# Patient Record
Sex: FEMALE | Race: OTHER | NOT HISPANIC OR LATINO
[De-identification: names, ages, dates, MRNs, and addresses within clinical notes are randomized per-mention and may not be internally consistent; named-entity substitution may affect disease eponyms.]

---

## 2017-03-26 ENCOUNTER — RESULT REVIEW (OUTPATIENT)
Age: 37
End: 2017-03-26

## 2017-04-19 ENCOUNTER — APPOINTMENT (OUTPATIENT)
Dept: ANTEPARTUM | Facility: CLINIC | Age: 37
End: 2017-04-19

## 2017-04-19 ENCOUNTER — ASOB RESULT (OUTPATIENT)
Age: 37
End: 2017-04-19

## 2017-06-14 ENCOUNTER — APPOINTMENT (OUTPATIENT)
Dept: ANTEPARTUM | Facility: CLINIC | Age: 37
End: 2017-06-14

## 2017-06-14 ENCOUNTER — ASOB RESULT (OUTPATIENT)
Age: 37
End: 2017-06-14

## 2017-08-24 ENCOUNTER — EMERGENCY (EMERGENCY)
Facility: HOSPITAL | Age: 37
LOS: 1 days | Discharge: HOME | End: 2017-08-24
Attending: EMERGENCY MEDICINE | Admitting: EMERGENCY MEDICINE
Payer: COMMERCIAL

## 2017-08-24 VITALS
HEART RATE: 101 BPM | OXYGEN SATURATION: 100 % | DIASTOLIC BLOOD PRESSURE: 73 MMHG | SYSTOLIC BLOOD PRESSURE: 110 MMHG | TEMPERATURE: 99 F | RESPIRATION RATE: 16 BRPM

## 2017-08-24 VITALS
TEMPERATURE: 98 F | HEART RATE: 86 BPM | DIASTOLIC BLOOD PRESSURE: 75 MMHG | SYSTOLIC BLOOD PRESSURE: 110 MMHG | RESPIRATION RATE: 16 BRPM | OXYGEN SATURATION: 98 %

## 2017-08-24 DIAGNOSIS — M79.606 PAIN IN LEG, UNSPECIFIED: ICD-10-CM

## 2017-08-24 DIAGNOSIS — Z33.1 PREGNANT STATE, INCIDENTAL: ICD-10-CM

## 2017-08-24 LAB
ALBUMIN SERPL ELPH-MCNC: 3.5 G/DL — SIGNIFICANT CHANGE UP (ref 3.3–5)
ALP SERPL-CCNC: 89 U/L — SIGNIFICANT CHANGE UP (ref 40–120)
ALT FLD-CCNC: 17 U/L RC — SIGNIFICANT CHANGE UP (ref 10–45)
ANION GAP SERPL CALC-SCNC: 14 MMOL/L — SIGNIFICANT CHANGE UP (ref 5–17)
APTT BLD: 27.1 SEC — LOW (ref 27.5–37.4)
AST SERPL-CCNC: 20 U/L — SIGNIFICANT CHANGE UP (ref 10–40)
BASOPHILS # BLD AUTO: 0 K/UL — SIGNIFICANT CHANGE UP (ref 0–0.2)
BASOPHILS NFR BLD AUTO: 0.2 % — SIGNIFICANT CHANGE UP (ref 0–2)
BILIRUB SERPL-MCNC: 0.2 MG/DL — SIGNIFICANT CHANGE UP (ref 0.2–1.2)
BUN SERPL-MCNC: 9 MG/DL — SIGNIFICANT CHANGE UP (ref 7–23)
CALCIUM SERPL-MCNC: 8.9 MG/DL — SIGNIFICANT CHANGE UP (ref 8.4–10.5)
CHLORIDE SERPL-SCNC: 102 MMOL/L — SIGNIFICANT CHANGE UP (ref 96–108)
CO2 SERPL-SCNC: 21 MMOL/L — LOW (ref 22–31)
CREAT SERPL-MCNC: 0.53 MG/DL — SIGNIFICANT CHANGE UP (ref 0.5–1.3)
D DIMER BLD IA.RAPID-MCNC: 461 NG/ML DDU — HIGH
EOSINOPHIL # BLD AUTO: 0.1 K/UL — SIGNIFICANT CHANGE UP (ref 0–0.5)
EOSINOPHIL NFR BLD AUTO: 0.8 % — SIGNIFICANT CHANGE UP (ref 0–6)
GLUCOSE SERPL-MCNC: 111 MG/DL — HIGH (ref 70–99)
HCT VFR BLD CALC: 33.3 % — LOW (ref 34.5–45)
HGB BLD-MCNC: 11.5 G/DL — SIGNIFICANT CHANGE UP (ref 11.5–15.5)
INR BLD: 0.96 RATIO — SIGNIFICANT CHANGE UP (ref 0.88–1.16)
LYMPHOCYTES # BLD AUTO: 1.3 K/UL — SIGNIFICANT CHANGE UP (ref 1–3.3)
LYMPHOCYTES # BLD AUTO: 17.9 % — SIGNIFICANT CHANGE UP (ref 13–44)
MCHC RBC-ENTMCNC: 31.2 PG — SIGNIFICANT CHANGE UP (ref 27–34)
MCHC RBC-ENTMCNC: 34.5 GM/DL — SIGNIFICANT CHANGE UP (ref 32–36)
MCV RBC AUTO: 90.5 FL — SIGNIFICANT CHANGE UP (ref 80–100)
MONOCYTES # BLD AUTO: 0.5 K/UL — SIGNIFICANT CHANGE UP (ref 0–0.9)
MONOCYTES NFR BLD AUTO: 6.7 % — SIGNIFICANT CHANGE UP (ref 2–14)
NEUTROPHILS # BLD AUTO: 5.5 K/UL — SIGNIFICANT CHANGE UP (ref 1.8–7.4)
NEUTROPHILS NFR BLD AUTO: 74.3 % — SIGNIFICANT CHANGE UP (ref 43–77)
PLATELET # BLD AUTO: 115 K/UL — LOW (ref 150–400)
POTASSIUM SERPL-MCNC: 4.2 MMOL/L — SIGNIFICANT CHANGE UP (ref 3.5–5.3)
POTASSIUM SERPL-SCNC: 4.2 MMOL/L — SIGNIFICANT CHANGE UP (ref 3.5–5.3)
PROT SERPL-MCNC: 6.4 G/DL — SIGNIFICANT CHANGE UP (ref 6–8.3)
PROTHROM AB SERPL-ACNC: 10.5 SEC — SIGNIFICANT CHANGE UP (ref 9.8–12.7)
RBC # BLD: 3.68 M/UL — LOW (ref 3.8–5.2)
RBC # FLD: 11.7 % — SIGNIFICANT CHANGE UP (ref 10.3–14.5)
SODIUM SERPL-SCNC: 137 MMOL/L — SIGNIFICANT CHANGE UP (ref 135–145)
WBC # BLD: 7.5 K/UL — SIGNIFICANT CHANGE UP (ref 3.8–10.5)
WBC # FLD AUTO: 7.5 K/UL — SIGNIFICANT CHANGE UP (ref 3.8–10.5)

## 2017-08-24 PROCEDURE — 85730 THROMBOPLASTIN TIME PARTIAL: CPT

## 2017-08-24 PROCEDURE — 93971 EXTREMITY STUDY: CPT

## 2017-08-24 PROCEDURE — 85610 PROTHROMBIN TIME: CPT

## 2017-08-24 PROCEDURE — 80053 COMPREHEN METABOLIC PANEL: CPT

## 2017-08-24 PROCEDURE — 93005 ELECTROCARDIOGRAM TRACING: CPT

## 2017-08-24 PROCEDURE — 99284 EMERGENCY DEPT VISIT MOD MDM: CPT | Mod: 25

## 2017-08-24 PROCEDURE — 71275 CT ANGIOGRAPHY CHEST: CPT

## 2017-08-24 PROCEDURE — 99285 EMERGENCY DEPT VISIT HI MDM: CPT | Mod: 25

## 2017-08-24 PROCEDURE — 85379 FIBRIN DEGRADATION QUANT: CPT

## 2017-08-24 PROCEDURE — 93971 EXTREMITY STUDY: CPT | Mod: 26

## 2017-08-24 PROCEDURE — 85027 COMPLETE CBC AUTOMATED: CPT

## 2017-08-24 PROCEDURE — 93308 TTE F-UP OR LMTD: CPT

## 2017-08-24 PROCEDURE — 93308 TTE F-UP OR LMTD: CPT | Mod: 26

## 2017-08-24 PROCEDURE — 93010 ELECTROCARDIOGRAM REPORT: CPT

## 2017-08-24 PROCEDURE — 71275 CT ANGIOGRAPHY CHEST: CPT | Mod: 26

## 2017-08-24 NOTE — CONSULT NOTE ADULT - PROBLEM SELECTOR RECOMMENDATION 9
-management as per ED  -BISHOP jonas WNL  -ED is suggesting CTA chest to r/o PE, however pt is refusing at this time. Pt counseled extensively on CTA chest and how it would help to r/o PE, which could be life threatening. However, pt and  understand risks and do not wish to proceed w/ CTA chest at this time

## 2017-08-24 NOTE — ED ADULT NURSE NOTE - CHPI ED SYMPTOMS NEG
no vomiting/no decreased eating/drinking/no fever/no chills/no dizziness/no tingling/no weakness/no numbness/no nausea

## 2017-08-24 NOTE — ED PROVIDER NOTE - MEDICAL DECISION MAKING DETAILS
36 yo F  30 weeks pregnant p/w SOB, dizziness, left calf pain, LLE DVT study, labs, ECHO, D-dimer, reeval

## 2017-08-24 NOTE — ED PROVIDER NOTE - PHYSICAL EXAMINATION
Gen: AAOx3, non-toxic  Head: NCAT  HEENT: EOMI, oral mucosa moist, normal conjunctiva  Lung: CTAB, no respiratory distress, no wheezes/rhonchi/rales B/L, speaking in full sentences  CV: RRR, no murmurs, rubs or gallops, no peripheral edema  Abd: soft, NTND, no guarding  MSK: no visible deformities, negative Rene's sign  Neuro: No focal sensory or motor deficits  Skin: Warm, well perfused, no rash  Psych: normal affect.   ~Tevin Aguayo M.D. Resident

## 2017-08-24 NOTE — ED PROCEDURE NOTE - ATTENDING CONTRIBUTION TO CARE
I have participated in and supervised all key portions of the above procedures and agree with the above documentation. KAROLINE Patrick MD

## 2017-08-24 NOTE — ED ADULT NURSE REASSESSMENT NOTE - NS ED NURSE REASSESS COMMENT FT1
Pt is refusing CTA, extensive conversations with , pt and MDs regarding importance of this test in the setting of pregnancy and elevated d dimer.  Pt is supposed to be d/c to OB for further monitoring due to abnormal fetal stress test done here.  Pt is refusing to sign AMA form at this time.  MD Abarca to speak with MD Pelayo re pt options for discharge from ED.  Pt cannot go to OB without being d/c from ED first.

## 2017-08-24 NOTE — ED PROVIDER NOTE - NS ED ROS FT
GENERAL: No fever or chills, EYES: no change in vision, HEENT: no trouble swallowing or speaking, CARDIAC: no chest pain, PULMONARY: +SOB, GI: no abdominal pain, +nausea, no diarrhea or constipation, : No changes in urination, SKIN: no rashes, NEURO: no headache, MSK: left calf pain  ~Tevin Aguayo M.D. Resident

## 2017-08-24 NOTE — ED PROVIDER NOTE - PROGRESS NOTE DETAILS
D-dimer result discussed with patient and  at bedside, plan to do CTA discussed and patient refused. Explanation was given regarding risks of pulmonary embolism that can cause premature heart failure and fetal death. Pt verbalized understanding of risks of not receiving CTA.  inquired about alternative V/Q scan. V/Q scan offered to patient but explanation was given that pt would need to be admitted for V/Q scan and pt refused admission.   Tevin Aguayo M.D. Resident Patient does not wish to be discharged against medical advice, would now like to be admitted for V/Q scan to assess for pulmonary embolism.  Tevin Aguayo M.D. Resident Attending Note (Wendy): patient now refusing v/q scan and agreeable to CTA.  risks/benefits explained to patient and family again.

## 2017-08-24 NOTE — CONSULT NOTE ADULT - PROBLEM SELECTOR RECOMMENDATION 2
-pt with no OB complaints  -NST reactive, cat I    d/w Dr. Zachary Stone MD PGY4 #[id number] -pt with no OB complaints  -NST in progress    d/w Dr. Zachary Stone MD PGY4 #[id number]

## 2017-08-24 NOTE — ED PROVIDER NOTE - ATTENDING CONTRIBUTION TO CARE
patient pregnant complaining of left calf pain and sob.  tachycardic in ED.  left calf non tender, +homanns.  US DVT negative.  concern for PE. will perform d-dimer.    Pregnancy adjusted d-dimer positive.  will perform CTA.

## 2017-08-24 NOTE — ED PROVIDER NOTE - OBJECTIVE STATEMENT
36 yo F  30 weeks pregnant p/w dizziness, nausea, left calf pain. When patient awoke this AM she felt a lump in her left calf and when she massaged it the lump disappeared. She later felt dizzy and SOB while ambulating. She was told at her OB that she had low platelets and borderline glucose with the oral glucose tolerance test. Denies CP, fevers, headaches.

## 2017-08-24 NOTE — ED ADULT NURSE NOTE - OBJECTIVE STATEMENT
37 yr old female 30 weeks pregnant present to the ER for left calf pain. Pt reports that she woke up around 10 am with a sharp pain in the calf; reports pain level of 10/10 on pain scale and sharp in quality. Pt denies any recent travels. Pt also report that she has shortness of breath upon exertion. Pt denies chest pain .

## 2017-08-24 NOTE — ED PROVIDER NOTE - PLAN OF CARE
You were seen in the Emergency Department for dizziness, shortness of breath, and leg pain. Advance activity as tolerated. Continue all previously prescribed medications as directed.  Follow up with your obstetrician in 24-48 hours - take copies of your results. You will need to repeat the lower extremity ultrasound in 5-7 days to reevaluate for a deep vein thrombosis. Return to the Emergency Department for worsening or persistent symptoms, and/or ANY NEW OR CONCERNING SYMPTOMS. If you have issues obtaining follow up, please call: [phone] to obtain a doctor or specialist who takes your insurance in your area.

## 2017-08-24 NOTE — CONSULT NOTE ADULT - SUBJECTIVE AND OBJECTIVE BOX
36 yo  @ 30+1 EDC 17 presents c/o dizziness, SOB, and L calf pain. Pt states that she began to feel LLE pain this AM w/ lump in LLE. Also reports weakness, dizziness, and SOB. Denies palpitations, HA, visual changes. Pt  has vomited x 1. PNC c/b low platelets. Reports good FM. Denies ctx, LOF, or VB.     OBHx: 2005 ,  LTCS breech,  LTCS for NRFHT failed TOLAC, 1 SAB s/p D&C  GynHx: none  PMH: kidney stones  PSH: c/s x 2, D&C x 1  All: NKDA  Meds: PNV  SocialHx: denies x 3    Vital Signs Last 24 Hrs  T(C): 37.1 (24 Aug 2017 14:19), Max: 37.1 (24 Aug 2017 14:19)  T(F): 98.8 (24 Aug 2017 14:19), Max: 98.8 (24 Aug 2017 14:19)  HR: 104 (24 Aug 2017 15:08) (101 - 104)  BP: 118/75 (24 Aug 2017 15:08) (110/73 - 118/75)  BP(mean): --  RR: 20 (24 Aug 2017 15:08) (16 - 20)  SpO2: 99% (24 Aug 2017 15:08) (99% - 100%)    PE:  Gen: NAD  CV: tachycardic, regular rhythm  Lungs: CTABL  Abd: soft NT ND gravid  Ext: NTBL, no edema noted    LABS:                        11.5   7.5   )-----------( 115      ( 24 Aug 2017 15:30 )             33.3     08-24    137  |  102  |  9   ----------------------------<  111<H>  4.2   |  21<L>  |  0.53    Ca    8.9      24 Aug 2017 15:30    TPro  6.4  /  Alb  3.5  /  TBili  0.2  /  DBili  x   /  AST  20  /  ALT  17  /  AlkPhos  89  08-24    PT/INR - ( 24 Aug 2017 16:39 )   PT: 10.5 sec;   INR: 0.96 ratio         PTT - ( 24 Aug 2017 16:39 )  PTT:27.1 sec      RADIOLOGY & ADDITIONAL STUDIES:   HPI:    37y G_P_, LMP      presents with       OBHx:  GynHx:   PMH:  PSH:  All:  Meds:   SocialHx:     Vital Signs Last 24 Hrs  T(C): 37.1 (24 Aug 2017 14:19), Max: 37.1 (24 Aug 2017 14:19)  T(F): 98.8 (24 Aug 2017 14:19), Max: 98.8 (24 Aug 2017 14:19)  HR: 104 (24 Aug 2017 15:08) (101 - 104)  BP: 118/75 (24 Aug 2017 15:08) (110/73 - 118/75)  BP(mean): --  RR: 20 (24 Aug 2017 15:08) (16 - 20)  SpO2: 99% (24 Aug 2017 15:08) (99% - 100%)    PE:  Gen: Comfortable, NAD  CV: RRR  Pulm: CTAB  Abd: Soft, NT  Ext: No edema or tenderness bilaterally  Spec Exam:    LABS:                        11.5   7.5   )-----------( 115      ( 24 Aug 2017 15:30 )             33.3     08-24    137  |  102  |  9   ----------------------------<  111<H>  4.2   |  21<L>  |  0.53    Ca    8.9      24 Aug 2017 15:30    TPro  6.4  /  Alb  3.5  /  TBili  0.2  /  DBili  x   /  AST  20  /  ALT  17  /  AlkPhos  89  08-24    PT/INR - ( 24 Aug 2017 16:39 )   PT: 10.5 sec;   INR: 0.96 ratio         PTT - ( 24 Aug 2017 16:39 )  PTT:27.1 sec      RADIOLOGY & ADDITIONAL STUDIES:      A/P: 37y G P   who present with  -  -      Cindi Stone PGY4 36 yo  @ 30+1 EDC 17 presents c/o dizziness, SOB, and L calf pain. Pt states that she began to feel LLE pain this AM w/ lump in LLE. Also reports weakness, dizziness, and SOB. Denies palpitations, HA, visual changes. Pt  has vomited x 1. PNC c/b low platelets. Reports good FM. Denies ctx, LOF, or VB.     OBHx: 2005 ,  LTCS breech,  LTCS for NRFHT failed TOLAC, 1 SAB s/p D&C  GynHx: none  PMH: kidney stones  PSH: c/s x 2, D&C x 1  All: NKDA  Meds: PNV  SocialHx: denies x 3    Vital Signs Last 24 Hrs  T(C): 37.1 (24 Aug 2017 14:19), Max: 37.1 (24 Aug 2017 14:19)  T(F): 98.8 (24 Aug 2017 14:19), Max: 98.8 (24 Aug 2017 14:19)  HR: 104 (24 Aug 2017 15:08) (101 - 104)  BP: 118/75 (24 Aug 2017 15:08) (110/73 - 118/75)  BP(mean): --  RR: 20 (24 Aug 2017 15:08) (16 - 20)  SpO2: 99% (24 Aug 2017 15:08) (99% - 100%)    PE:  Gen: NAD  CV: tachycardic, regular rhythm  Lungs: CTABL  Abd: soft NT ND gravid  Ext: NTBL, no edema noted    LABS:                        11.5   7.5   )-----------( 115      ( 24 Aug 2017 15:30 )             33.3     08-24    137  |  102  |  9   ----------------------------<  111<H>  4.2   |  21<L>  |  0.53    Ca    8.9      24 Aug 2017 15:30    TPro  6.4  /  Alb  3.5  /  TBili  0.2  /  DBili  x   /  AST  20  /  ALT  17  /  AlkPhos  89  08-24    PT/INR - ( 24 Aug 2017 16:39 )   PT: 10.5 sec;   INR: 0.96 ratio         PTT - ( 24 Aug 2017 16:39 )  PTT:27.1 sec      RADIOLOGY & ADDITIONAL STUDIES: 36 yo  @ 30+1 EDC 17 presents c/o dizziness, SOB, and L calf pain. Pt states that she began to feel LLE pain this AM w/ lump in LLE. Also reports weakness, dizziness, and SOB. Denies palpitations, HA, visual changes. Pt  has vomited x 1. PNC c/b low platelets. Reports good FM. Denies ctx, LOF, or VB.     OBHx: 2005 ,  LTCS breech,  LTCS for NRFHT failed TOLAC, 1 SAB s/p D&C  GynHx: none  PMH: kidney stones  PSH: c/s x 2, D&C x 1  All: NKDA  Meds: PNV  SocialHx: denies x 3    Vital Signs Last 24 Hrs  T(C): 37.1 (24 Aug 2017 14:19), Max: 37.1 (24 Aug 2017 14:19)  T(F): 98.8 (24 Aug 2017 14:19), Max: 98.8 (24 Aug 2017 14:19)  HR: 104 (24 Aug 2017 15:08) (101 - 104)  BP: 118/75 (24 Aug 2017 15:08) (110/73 - 118/75)  BP(mean): --  RR: 20 (24 Aug 2017 15:08) (16 - 20)  SpO2: 99% (24 Aug 2017 15:08) (99% - 100%)    PE:  Gen: NAD  CV: tachycardic, regular rhythm  Lungs: CTABL  Abd: soft NT ND gravid  Ext: NTBL, no edema noted    LABS:                        11.5   7.5   )-----------( 115      ( 24 Aug 2017 15:30 )             33.3     08-24    137  |  102  |  9   ----------------------------<  111<H>  4.2   |  21<L>  |  0.53    Ca    8.9      24 Aug 2017 15:30    TPro  6.4  /  Alb  3.5  /  TBili  0.2  /  DBili  x   /  AST  20  /  ALT  17  /  AlkPhos  89  08-24    PT/INR - ( 24 Aug 2017 16:39 )   PT: 10.5 sec;   INR: 0.96 ratio         PTT - ( 24 Aug 2017 16:39 )  PTT:27.1 sec      RADIOLOGY & ADDITIONAL STUDIES:    Procedure was performed in the Emergency Department by a credentialed   Emergency Medicine Attending Physician    EXAM:  ED TTE LIMITED [id number]      ORDER COMMENTS:      PROCEDURE DATE:  2017    FOCUSED ED ULTRASOUND REPORT          INTERPRETATION:  A focused transthoracic cardiac ultrasound examination   was performed.   No pericardial effusion was present.    No global wall motion abnormality was identified.    Additionally, a limited transabdominal fetal assessment was performed.    Fetal heart rate assessed, 167 bpm.    IMPRESSION:   No Pericardial Effusion.  FHR 167bpm      Procedure was performed in the Emergency Department by a credentialed   Emergency Medicine Attending Physician    EXAM:  ED US DVT UNIL LTD [id number]      ORDER COMMENTS:      PROCEDURE DATE:  2017    FOCUSED ED ULTRASOUND REPORT          INTERPRETATION:  Focused imaging of the left and right lower extremities   was performed using gray scale compression technique for evaluation of   deep venous thrombosis.   The femoral region was scanned from the common femoral vein proximal to   the greater saphenous vein insertion distally to the femoral/deep femoral   vein junction.  The popliteal region was scanned from the popliteal vein distally to the   trifurcation.  There is no evidence of proximal deep venous thrombosis.  Calf vein   thrombosis and focal segmental deep venous thrombosis cannot be excluded.  The patient should have a repeat lower extremity ultrasound in 5-7 days.     IMPRESSION:  No Evidence of proximal deep vein thrombosis in the left and   right  lower extremities.

## 2017-10-10 ENCOUNTER — RESULT REVIEW (OUTPATIENT)
Age: 37
End: 2017-10-10

## 2017-10-10 ENCOUNTER — INPATIENT (INPATIENT)
Facility: HOSPITAL | Age: 37
LOS: 1 days | Discharge: HOME | End: 2017-10-12
Attending: OBSTETRICS & GYNECOLOGY | Admitting: OBSTETRICS & GYNECOLOGY
Payer: COMMERCIAL

## 2017-10-10 VITALS
HEART RATE: 87 BPM | RESPIRATION RATE: 20 BRPM | OXYGEN SATURATION: 96 % | TEMPERATURE: 101 F | SYSTOLIC BLOOD PRESSURE: 131 MMHG | DIASTOLIC BLOOD PRESSURE: 89 MMHG

## 2017-10-10 DIAGNOSIS — Z3A.00 WEEKS OF GESTATION OF PREGNANCY NOT SPECIFIED: ICD-10-CM

## 2017-10-10 DIAGNOSIS — O26.899 OTHER SPECIFIED PREGNANCY RELATED CONDITIONS, UNSPECIFIED TRIMESTER: ICD-10-CM

## 2017-10-10 DIAGNOSIS — Z34.80 ENCOUNTER FOR SUPERVISION OF OTHER NORMAL PREGNANCY, UNSPECIFIED TRIMESTER: ICD-10-CM

## 2017-10-10 LAB
BASOPHILS # BLD AUTO: 0 K/UL — SIGNIFICANT CHANGE UP (ref 0–0.2)
BASOPHILS NFR BLD AUTO: 0.7 % — SIGNIFICANT CHANGE UP (ref 0–2)
BLD GP AB SCN SERPL QL: NEGATIVE — SIGNIFICANT CHANGE UP
EOSINOPHIL # BLD AUTO: 0 K/UL — SIGNIFICANT CHANGE UP (ref 0–0.5)
EOSINOPHIL NFR BLD AUTO: 0.6 % — SIGNIFICANT CHANGE UP (ref 0–6)
HCT VFR BLD CALC: 37.4 % — SIGNIFICANT CHANGE UP (ref 34.5–45)
HGB BLD-MCNC: 13.1 G/DL — SIGNIFICANT CHANGE UP (ref 11.5–15.5)
LYMPHOCYTES # BLD AUTO: 1.5 K/UL — SIGNIFICANT CHANGE UP (ref 1–3.3)
LYMPHOCYTES # BLD AUTO: 24.7 % — SIGNIFICANT CHANGE UP (ref 13–44)
MCHC RBC-ENTMCNC: 31.4 PG — SIGNIFICANT CHANGE UP (ref 27–34)
MCHC RBC-ENTMCNC: 35.1 GM/DL — SIGNIFICANT CHANGE UP (ref 32–36)
MCV RBC AUTO: 89.2 FL — SIGNIFICANT CHANGE UP (ref 80–100)
MONOCYTES # BLD AUTO: 0.4 K/UL — SIGNIFICANT CHANGE UP (ref 0–0.9)
MONOCYTES NFR BLD AUTO: 7 % — SIGNIFICANT CHANGE UP (ref 2–14)
NEUTROPHILS # BLD AUTO: 4.2 K/UL — SIGNIFICANT CHANGE UP (ref 1.8–7.4)
NEUTROPHILS NFR BLD AUTO: 67.1 % — SIGNIFICANT CHANGE UP (ref 43–77)
PLATELET # BLD AUTO: 120 K/UL — LOW (ref 150–400)
RBC # BLD: 4.19 M/UL — SIGNIFICANT CHANGE UP (ref 3.8–5.2)
RBC # FLD: 12.3 % — SIGNIFICANT CHANGE UP (ref 10.3–14.5)
RH IG SCN BLD-IMP: POSITIVE — SIGNIFICANT CHANGE UP
RH IG SCN BLD-IMP: POSITIVE — SIGNIFICANT CHANGE UP
T PALLIDUM AB TITR SER: NEGATIVE — SIGNIFICANT CHANGE UP
WBC # BLD: 6.2 K/UL — SIGNIFICANT CHANGE UP (ref 3.8–10.5)
WBC # FLD AUTO: 6.2 K/UL — SIGNIFICANT CHANGE UP (ref 3.8–10.5)

## 2017-10-10 PROCEDURE — 88302 TISSUE EXAM BY PATHOLOGIST: CPT | Mod: 26

## 2017-10-10 RX ORDER — SODIUM CHLORIDE 9 MG/ML
1000 INJECTION, SOLUTION INTRAVENOUS
Qty: 0 | Refills: 0 | Status: DISCONTINUED | OUTPATIENT
Start: 2017-10-10 | End: 2017-10-12

## 2017-10-10 RX ORDER — KETOROLAC TROMETHAMINE 30 MG/ML
30 SYRINGE (ML) INJECTION EVERY 6 HOURS
Qty: 0 | Refills: 0 | Status: DISCONTINUED | OUTPATIENT
Start: 2017-10-10 | End: 2017-10-12

## 2017-10-10 RX ORDER — CEFAZOLIN SODIUM 1 G
VIAL (EA) INJECTION
Qty: 0 | Refills: 0 | Status: DISCONTINUED | OUTPATIENT
Start: 2017-10-10 | End: 2017-10-11

## 2017-10-10 RX ORDER — NALOXONE HYDROCHLORIDE 4 MG/.1ML
0.1 SPRAY NASAL
Qty: 0 | Refills: 0 | Status: DISCONTINUED | OUTPATIENT
Start: 2017-10-10 | End: 2017-10-12

## 2017-10-10 RX ORDER — SODIUM CHLORIDE 9 MG/ML
1000 INJECTION, SOLUTION INTRAVENOUS
Qty: 0 | Refills: 0 | Status: DISCONTINUED | OUTPATIENT
Start: 2017-10-10 | End: 2017-10-10

## 2017-10-10 RX ORDER — GLYCERIN ADULT
1 SUPPOSITORY, RECTAL RECTAL AT BEDTIME
Qty: 0 | Refills: 0 | Status: DISCONTINUED | OUTPATIENT
Start: 2017-10-10 | End: 2017-10-12

## 2017-10-10 RX ORDER — HEPARIN SODIUM 5000 [USP'U]/ML
5000 INJECTION INTRAVENOUS; SUBCUTANEOUS EVERY 12 HOURS
Qty: 0 | Refills: 0 | Status: DISCONTINUED | OUTPATIENT
Start: 2017-10-10 | End: 2017-10-10

## 2017-10-10 RX ORDER — OXYCODONE HYDROCHLORIDE 5 MG/1
5 TABLET ORAL
Qty: 0 | Refills: 0 | Status: COMPLETED | OUTPATIENT
Start: 2017-10-10 | End: 2017-10-17

## 2017-10-10 RX ORDER — FAMOTIDINE 10 MG/ML
20 INJECTION INTRAVENOUS ONCE
Qty: 0 | Refills: 0 | Status: COMPLETED | OUTPATIENT
Start: 2017-10-10 | End: 2017-10-10

## 2017-10-10 RX ORDER — CITRIC ACID/SODIUM CITRATE 300-500 MG
30 SOLUTION, ORAL ORAL ONCE
Qty: 0 | Refills: 0 | Status: COMPLETED | OUTPATIENT
Start: 2017-10-10 | End: 2017-10-10

## 2017-10-10 RX ORDER — DEXAMETHASONE 0.5 MG/5ML
4 ELIXIR ORAL EVERY 6 HOURS
Qty: 0 | Refills: 0 | Status: DISCONTINUED | OUTPATIENT
Start: 2017-10-10 | End: 2017-10-12

## 2017-10-10 RX ORDER — ACETAMINOPHEN 500 MG
650 TABLET ORAL EVERY 6 HOURS
Qty: 0 | Refills: 0 | Status: DISCONTINUED | OUTPATIENT
Start: 2017-10-10 | End: 2017-10-12

## 2017-10-10 RX ORDER — OXYTOCIN 10 UNIT/ML
333.33 VIAL (ML) INJECTION
Qty: 20 | Refills: 0 | Status: DISCONTINUED | OUTPATIENT
Start: 2017-10-10 | End: 2017-10-12

## 2017-10-10 RX ORDER — SIMETHICONE 80 MG/1
80 TABLET, CHEWABLE ORAL EVERY 4 HOURS
Qty: 0 | Refills: 0 | Status: DISCONTINUED | OUTPATIENT
Start: 2017-10-10 | End: 2017-10-12

## 2017-10-10 RX ORDER — DIPHENHYDRAMINE HCL 50 MG
25 CAPSULE ORAL EVERY 6 HOURS
Qty: 0 | Refills: 0 | Status: DISCONTINUED | OUTPATIENT
Start: 2017-10-10 | End: 2017-10-12

## 2017-10-10 RX ORDER — LANOLIN
1 OINTMENT (GRAM) TOPICAL
Qty: 0 | Refills: 0 | Status: DISCONTINUED | OUTPATIENT
Start: 2017-10-10 | End: 2017-10-12

## 2017-10-10 RX ORDER — IBUPROFEN 200 MG
600 TABLET ORAL EVERY 6 HOURS
Qty: 0 | Refills: 0 | Status: COMPLETED | OUTPATIENT
Start: 2017-10-10 | End: 2018-09-08

## 2017-10-10 RX ORDER — DOCUSATE SODIUM 100 MG
100 CAPSULE ORAL
Qty: 0 | Refills: 0 | Status: DISCONTINUED | OUTPATIENT
Start: 2017-10-10 | End: 2017-10-12

## 2017-10-10 RX ORDER — HYDROMORPHONE HYDROCHLORIDE 2 MG/ML
0.5 INJECTION INTRAMUSCULAR; INTRAVENOUS; SUBCUTANEOUS
Qty: 0 | Refills: 0 | Status: DISCONTINUED | OUTPATIENT
Start: 2017-10-10 | End: 2017-10-12

## 2017-10-10 RX ORDER — METOCLOPRAMIDE HCL 10 MG
10 TABLET ORAL ONCE
Qty: 0 | Refills: 0 | Status: DISCONTINUED | OUTPATIENT
Start: 2017-10-10 | End: 2017-10-10

## 2017-10-10 RX ORDER — INFLUENZA VIRUS VACCINE 15; 15; 15; 15 UG/.5ML; UG/.5ML; UG/.5ML; UG/.5ML
0.5 SUSPENSION INTRAMUSCULAR ONCE
Qty: 0 | Refills: 0 | Status: DISCONTINUED | OUTPATIENT
Start: 2017-10-10 | End: 2017-10-12

## 2017-10-10 RX ORDER — HYDROMORPHONE HYDROCHLORIDE 2 MG/ML
30 INJECTION INTRAMUSCULAR; INTRAVENOUS; SUBCUTANEOUS
Qty: 0 | Refills: 0 | Status: DISCONTINUED | OUTPATIENT
Start: 2017-10-10 | End: 2017-10-10

## 2017-10-10 RX ORDER — CEFAZOLIN SODIUM 1 G
2000 VIAL (EA) INJECTION EVERY 8 HOURS
Qty: 0 | Refills: 0 | Status: DISCONTINUED | OUTPATIENT
Start: 2017-10-10 | End: 2017-10-11

## 2017-10-10 RX ORDER — HEPARIN SODIUM 5000 [USP'U]/ML
5000 INJECTION INTRAVENOUS; SUBCUTANEOUS EVERY 12 HOURS
Qty: 0 | Refills: 0 | Status: DISCONTINUED | OUTPATIENT
Start: 2017-10-10 | End: 2017-10-12

## 2017-10-10 RX ORDER — ACETAMINOPHEN 500 MG
1000 TABLET ORAL ONCE
Qty: 0 | Refills: 0 | Status: COMPLETED | OUTPATIENT
Start: 2017-10-10 | End: 2017-10-10

## 2017-10-10 RX ORDER — OXYCODONE HYDROCHLORIDE 5 MG/1
5 TABLET ORAL EVERY 4 HOURS
Qty: 0 | Refills: 0 | Status: DISCONTINUED | OUTPATIENT
Start: 2017-10-10 | End: 2017-10-12

## 2017-10-10 RX ORDER — SODIUM CHLORIDE 9 MG/ML
1000 INJECTION, SOLUTION INTRAVENOUS ONCE
Qty: 0 | Refills: 0 | Status: COMPLETED | OUTPATIENT
Start: 2017-10-10 | End: 2017-10-10

## 2017-10-10 RX ORDER — CEFAZOLIN SODIUM 1 G
2000 VIAL (EA) INJECTION ONCE
Qty: 0 | Refills: 0 | Status: COMPLETED | OUTPATIENT
Start: 2017-10-10 | End: 2017-10-10

## 2017-10-10 RX ORDER — ONDANSETRON 8 MG/1
4 TABLET, FILM COATED ORAL EVERY 6 HOURS
Qty: 0 | Refills: 0 | Status: DISCONTINUED | OUTPATIENT
Start: 2017-10-10 | End: 2017-10-12

## 2017-10-10 RX ORDER — ACETAMINOPHEN 500 MG
975 TABLET ORAL EVERY 6 HOURS
Qty: 0 | Refills: 0 | Status: DISCONTINUED | OUTPATIENT
Start: 2017-10-10 | End: 2017-10-12

## 2017-10-10 RX ORDER — HYDROMORPHONE HYDROCHLORIDE 2 MG/ML
30 INJECTION INTRAMUSCULAR; INTRAVENOUS; SUBCUTANEOUS
Qty: 0 | Refills: 0 | Status: DISCONTINUED | OUTPATIENT
Start: 2017-10-10 | End: 2017-10-12

## 2017-10-10 RX ORDER — OXYTOCIN 10 UNIT/ML
41.67 VIAL (ML) INJECTION
Qty: 20 | Refills: 0 | Status: DISCONTINUED | OUTPATIENT
Start: 2017-10-10 | End: 2017-10-12

## 2017-10-10 RX ORDER — FERROUS SULFATE 325(65) MG
325 TABLET ORAL DAILY
Qty: 0 | Refills: 0 | Status: DISCONTINUED | OUTPATIENT
Start: 2017-10-10 | End: 2017-10-11

## 2017-10-10 RX ORDER — TETANUS TOXOID, REDUCED DIPHTHERIA TOXOID AND ACELLULAR PERTUSSIS VACCINE, ADSORBED 5; 2.5; 8; 8; 2.5 [IU]/.5ML; [IU]/.5ML; UG/.5ML; UG/.5ML; UG/.5ML
0.5 SUSPENSION INTRAMUSCULAR ONCE
Qty: 0 | Refills: 0 | Status: COMPLETED | OUTPATIENT
Start: 2017-10-12

## 2017-10-10 RX ADMIN — HEPARIN SODIUM 5000 UNIT(S): 5000 INJECTION INTRAVENOUS; SUBCUTANEOUS at 19:11

## 2017-10-10 RX ADMIN — HYDROMORPHONE HYDROCHLORIDE 30 MILLILITER(S): 2 INJECTION INTRAMUSCULAR; INTRAVENOUS; SUBCUTANEOUS at 06:59

## 2017-10-10 RX ADMIN — HYDROMORPHONE HYDROCHLORIDE 30 MILLILITER(S): 2 INJECTION INTRAMUSCULAR; INTRAVENOUS; SUBCUTANEOUS at 09:49

## 2017-10-10 RX ADMIN — Medication 400 MILLIGRAM(S): at 06:55

## 2017-10-10 RX ADMIN — Medication 100 MILLIGRAM(S): at 12:37

## 2017-10-10 RX ADMIN — Medication 30 MILLILITER(S): at 04:18

## 2017-10-10 RX ADMIN — ONDANSETRON 4 MILLIGRAM(S): 8 TABLET, FILM COATED ORAL at 17:41

## 2017-10-10 RX ADMIN — Medication 30 MILLIGRAM(S): at 17:55

## 2017-10-10 RX ADMIN — SIMETHICONE 80 MILLIGRAM(S): 80 TABLET, CHEWABLE ORAL at 12:37

## 2017-10-10 RX ADMIN — HYDROMORPHONE HYDROCHLORIDE 30 MILLILITER(S): 2 INJECTION INTRAMUSCULAR; INTRAVENOUS; SUBCUTANEOUS at 13:37

## 2017-10-10 RX ADMIN — Medication 30 MILLIGRAM(S): at 08:25

## 2017-10-10 RX ADMIN — FAMOTIDINE 20 MILLIGRAM(S): 10 INJECTION INTRAVENOUS at 04:18

## 2017-10-10 RX ADMIN — SODIUM CHLORIDE 125 MILLILITER(S): 9 INJECTION, SOLUTION INTRAVENOUS at 04:10

## 2017-10-10 RX ADMIN — SODIUM CHLORIDE 2000 MILLILITER(S): 9 INJECTION, SOLUTION INTRAVENOUS at 03:30

## 2017-10-10 RX ADMIN — Medication 975 MILLIGRAM(S): at 12:37

## 2017-10-10 RX ADMIN — Medication 975 MILLIGRAM(S): at 13:03

## 2017-10-10 RX ADMIN — Medication 30 MILLIGRAM(S): at 17:41

## 2017-10-10 RX ADMIN — Medication 100 MILLIGRAM(S): at 04:55

## 2017-10-10 RX ADMIN — Medication 100 MILLIGRAM(S): at 16:07

## 2017-10-10 NOTE — PATIENT PROFILE OB - NS PRO INDICAT FLU
Patient/surrogate requesting vaccine Patient/surrogate requesting vaccine/Patient is immunocompromised

## 2017-10-11 ENCOUNTER — TRANSCRIPTION ENCOUNTER (OUTPATIENT)
Age: 37
End: 2017-10-11

## 2017-10-11 LAB
HCT VFR BLD CALC: 27.3 % — LOW (ref 34.5–45)
HGB BLD-MCNC: 9.3 G/DL — LOW (ref 11.5–15.5)
MCHC RBC-ENTMCNC: 31.2 PG — SIGNIFICANT CHANGE UP (ref 27–34)
MCHC RBC-ENTMCNC: 34.1 GM/DL — SIGNIFICANT CHANGE UP (ref 32–36)
MCV RBC AUTO: 91.3 FL — SIGNIFICANT CHANGE UP (ref 80–100)
PLATELET # BLD AUTO: 85 K/UL — LOW (ref 150–400)
RBC # BLD: 2.99 M/UL — LOW (ref 3.8–5.2)
RBC # FLD: 12.2 % — SIGNIFICANT CHANGE UP (ref 10.3–14.5)
WBC # BLD: 8.3 K/UL — SIGNIFICANT CHANGE UP (ref 3.8–10.5)
WBC # FLD AUTO: 8.3 K/UL — SIGNIFICANT CHANGE UP (ref 3.8–10.5)

## 2017-10-11 RX ORDER — ASCORBIC ACID 60 MG
250 TABLET,CHEWABLE ORAL
Qty: 0 | Refills: 0 | Status: DISCONTINUED | OUTPATIENT
Start: 2017-10-11 | End: 2017-10-12

## 2017-10-11 RX ORDER — FERROUS SULFATE 325(65) MG
325 TABLET ORAL
Qty: 0 | Refills: 0 | Status: DISCONTINUED | OUTPATIENT
Start: 2017-10-11 | End: 2017-10-12

## 2017-10-11 RX ADMIN — Medication 100 MILLIGRAM(S): at 08:15

## 2017-10-11 RX ADMIN — Medication 30 MILLIGRAM(S): at 13:10

## 2017-10-11 RX ADMIN — Medication 100 MILLIGRAM(S): at 17:49

## 2017-10-11 RX ADMIN — SODIUM CHLORIDE 125 MILLILITER(S): 9 INJECTION, SOLUTION INTRAVENOUS at 16:32

## 2017-10-11 RX ADMIN — Medication 30 MILLIGRAM(S): at 06:59

## 2017-10-11 RX ADMIN — Medication 30 MILLIGRAM(S): at 12:40

## 2017-10-11 RX ADMIN — HYDROMORPHONE HYDROCHLORIDE 30 MILLILITER(S): 2 INJECTION INTRAMUSCULAR; INTRAVENOUS; SUBCUTANEOUS at 07:19

## 2017-10-11 RX ADMIN — Medication 30 MILLIGRAM(S): at 06:35

## 2017-10-11 RX ADMIN — Medication 30 MILLIGRAM(S): at 17:50

## 2017-10-11 RX ADMIN — HEPARIN SODIUM 5000 UNIT(S): 5000 INJECTION INTRAVENOUS; SUBCUTANEOUS at 17:49

## 2017-10-11 RX ADMIN — Medication 30 MILLIGRAM(S): at 18:20

## 2017-10-11 RX ADMIN — Medication 30 MILLIGRAM(S): at 01:00

## 2017-10-11 RX ADMIN — HYDROMORPHONE HYDROCHLORIDE 30 MILLILITER(S): 2 INJECTION INTRAMUSCULAR; INTRAVENOUS; SUBCUTANEOUS at 19:10

## 2017-10-11 RX ADMIN — Medication 325 MILLIGRAM(S): at 17:49

## 2017-10-11 RX ADMIN — Medication 250 MILLIGRAM(S): at 17:50

## 2017-10-11 RX ADMIN — Medication 1 TABLET(S): at 12:40

## 2017-10-11 RX ADMIN — Medication 100 MILLIGRAM(S): at 00:42

## 2017-10-11 RX ADMIN — HEPARIN SODIUM 5000 UNIT(S): 5000 INJECTION INTRAVENOUS; SUBCUTANEOUS at 06:36

## 2017-10-11 RX ADMIN — Medication 30 MILLIGRAM(S): at 00:42

## 2017-10-11 NOTE — PROGRESS NOTE ADULT - SUBJECTIVE AND OBJECTIVE BOX
Pain Management Attending Addendum    SUBJECTIVE: Patient doing well with PCEA    Therapy:    [X] PCEA    OBJECTIVE:   [X] Pain appropriately controlled    [ ] Other:    Side Effects:  [X] None	             [ ] Nausea              [ ] Pruritis        [ ] Weakness          [ ] Numbness        	[ ] Other:    ASSESSMENT/PLAN:    [X] Continue current therapy    [ ] Therapy changed to:    [ ] PRN Analgesics   [ ] IV PCA    Comments:

## 2017-10-11 NOTE — LACTATION INITIAL EVALUATION - PRO BREASTFEED PREV EXP YN OB
yes/BF exclusively 2 of her children for 6-7 months and another for 1 month until she got pregnant then she stopped.

## 2017-10-11 NOTE — PROGRESS NOTE ADULT - SUBJECTIVE AND OBJECTIVE BOX
Postpartum Note,  Section  She is a  37y woman who is now post-operative day: 1    Subjective:  The patient feels well, no acute complaints.  Ambulating, tolerating PO diet, denies nausea/vomiting.  Rogel in place, draining well, and passing gas.  Pain is controlled. Reports normal postpartum bleeding.    Physical exam:    Vital Signs Last 24 Hrs  T(C): 36.6 (11 Oct 2017 05:00), Max: 36.8 (11 Oct 2017 01:12)  T(F): 97.9 (11 Oct 2017 05:00), Max: 98.2 (11 Oct 2017 01:12)  HR: 96 (11 Oct 2017 05:00) (64 - 96)  BP: 95/63 (11 Oct 2017 05:00) (95/63 - 121/76)  BP(mean): 78 (10 Oct 2017 09:20) (78 - 94)  RR: 18 (11 Oct 2017 05:00) (9 - 29)  SpO2: 96% (11 Oct 2017 01:12) (91% - 99%)    Gen: NAD  Abdomen: Soft, nontender, no distension , firm uterine fundus at umbilicus.  Incision: Clean, dry, and intact with steri strips  Pelvic: Normal lochia noted  Ext: No calf tenderness    LABS:                        9.3    8.3   )-----------( 85       ( 11 Oct 2017 06:29 )             27.3                         13.1   6.2   )-----------( 120      ( 10 Oct 2017 03:16 )             37.4                   Allergies    No Known Allergies    Intolerances      MEDICATIONS  (STANDING):  acetaminophen   Tablet. 975 milliGRAM(s) Oral every 6 hours  ceFAZolin   IVPB 2000 milliGRAM(s) IV Intermittent every 8 hours  ceFAZolin   IVPB      ferrous    sulfate 325 milliGRAM(s) Oral daily  heparin  Injectable 5000 Unit(s) SubCutaneous every 12 hours  HYDROmorphone PCA (1 mG/mL) 30 milliLiter(s) PCA Continuous PCA Continuous  ibuprofen  Tablet 600 milliGRAM(s) Oral every 6 hours  influenza   Vaccine 0.5 milliLiter(s) IntraMuscular once  ketorolac   Injectable 30 milliGRAM(s) IV Push every 6 hours  lactated ringers. 1000 milliLiter(s) (125 mL/Hr) IV Continuous <Continuous>  oxyCODONE    IR 5 milliGRAM(s) Oral every 3 hours  oxytocin Infusion 333.333 milliUNIT(s)/Min (1000 mL/Hr) IV Continuous <Continuous>  oxytocin Infusion 41.667 milliUNIT(s)/Min (125 mL/Hr) IV Continuous <Continuous>  prenatal multivitamin 1 Tablet(s) Oral daily    MEDICATIONS  (PRN):  acetaminophen   Tablet 650 milliGRAM(s) Oral every 6 hours PRN For Temp greater than 38.5 C (101.3 F)  dexamethasone  Injectable 4 milliGRAM(s) IV Push every 6 hours PRN Nausea, IF ondansetron is ineffective after 30 - 60 minutes  diphenhydrAMINE   Capsule 25 milliGRAM(s) Oral every 6 hours PRN Itching  docusate sodium 100 milliGRAM(s) Oral two times a day PRN Stool Softening  glycerin Suppository - Adult 1 Suppository(s) Rectal at bedtime PRN Constipation  HYDROmorphone PCA (1 mG/mL) Rescue Clinician Bolus 0.5 milliGRAM(s) IV Push every 15 minutes PRN for Pain Scale GREATER THAN 6  lanolin Ointment 1 Application(s) Topical every 3 hours PRN Sore Nipples  naloxone Injectable 0.1 milliGRAM(s) IV Push every 3 minutes PRN For ANY of the following changes in patient status:  A. RR LESS THAN 10 breaths per minute, B. Oxygen saturation LESS THAN 90%, C. Sedation score of 6  naloxone Injectable 0.1 milliGRAM(s) IV Push every 3 minutes PRN For ANY of the following changes in patient status:  A. RR LESS THAN 10 breaths per minute, B. Oxygen saturation LESS THAN 90%, C. Sedation score of 6  ondansetron Injectable 4 milliGRAM(s) IV Push every 6 hours PRN Nausea  ondansetron Injectable 4 milliGRAM(s) IV Push every 6 hours PRN Nausea  oxyCODONE    IR 5 milliGRAM(s) Oral every 4 hours PRN Severe Pain (7 - 10)  simethicone 80 milliGRAM(s) Chew every 4 hours PRN Gas

## 2017-10-11 NOTE — PROGRESS NOTE ADULT - SUBJECTIVE AND OBJECTIVE BOX
Day 1 of Anesthesia Pain Management Service    SUBJECTIVE: Patient is doing well with IV PCA    Pain Scale Score:	[X] Refer to charted pain scores    THERAPY:    [ ] IV PCA Morphine		[ ] 5 mg/mL	[ ] 1 mg/mL  [X] IV PCA Hydromorphone	[ ] 5 mg/mL	[X] 1 mg/mL  [ ] IV PCA Fentanyl		[ ] 50 micrograms/mL    Demand dose: 0.2 mg     Lockout: 6 minutes   Continuous Rate: 0 mg/hr  4 Hour Limit: 4 mg    MEDICATIONS  (STANDING):  acetaminophen   Tablet. 975 milliGRAM(s) Oral every 6 hours  ascorbic acid 250 milliGRAM(s) Oral two times a day  ceFAZolin   IVPB 2000 milliGRAM(s) IV Intermittent every 8 hours  ceFAZolin   IVPB      ferrous    sulfate 325 milliGRAM(s) Oral two times a day with meals  heparin  Injectable 5000 Unit(s) SubCutaneous every 12 hours  HYDROmorphone PCA (1 mG/mL) 30 milliLiter(s) PCA Continuous PCA Continuous  ibuprofen  Tablet 600 milliGRAM(s) Oral every 6 hours  influenza   Vaccine 0.5 milliLiter(s) IntraMuscular once  ketorolac   Injectable 30 milliGRAM(s) IV Push every 6 hours  lactated ringers. 1000 milliLiter(s) (125 mL/Hr) IV Continuous <Continuous>  oxyCODONE    IR 5 milliGRAM(s) Oral every 3 hours  oxytocin Infusion 333.333 milliUNIT(s)/Min (1000 mL/Hr) IV Continuous <Continuous>  oxytocin Infusion 41.667 milliUNIT(s)/Min (125 mL/Hr) IV Continuous <Continuous>  prenatal multivitamin 1 Tablet(s) Oral daily    MEDICATIONS  (PRN):  acetaminophen   Tablet 650 milliGRAM(s) Oral every 6 hours PRN For Temp greater than 38.5 C (101.3 F)  dexamethasone  Injectable 4 milliGRAM(s) IV Push every 6 hours PRN Nausea, IF ondansetron is ineffective after 30 - 60 minutes  diphenhydrAMINE   Capsule 25 milliGRAM(s) Oral every 6 hours PRN Itching  docusate sodium 100 milliGRAM(s) Oral two times a day PRN Stool Softening  glycerin Suppository - Adult 1 Suppository(s) Rectal at bedtime PRN Constipation  HYDROmorphone PCA (1 mG/mL) Rescue Clinician Bolus 0.5 milliGRAM(s) IV Push every 15 minutes PRN for Pain Scale GREATER THAN 6  lanolin Ointment 1 Application(s) Topical every 3 hours PRN Sore Nipples  naloxone Injectable 0.1 milliGRAM(s) IV Push every 3 minutes PRN For ANY of the following changes in patient status:  A. RR LESS THAN 10 breaths per minute, B. Oxygen saturation LESS THAN 90%, C. Sedation score of 6  naloxone Injectable 0.1 milliGRAM(s) IV Push every 3 minutes PRN For ANY of the following changes in patient status:  A. RR LESS THAN 10 breaths per minute, B. Oxygen saturation LESS THAN 90%, C. Sedation score of 6  ondansetron Injectable 4 milliGRAM(s) IV Push every 6 hours PRN Nausea  ondansetron Injectable 4 milliGRAM(s) IV Push every 6 hours PRN Nausea  oxyCODONE    IR 5 milliGRAM(s) Oral every 4 hours PRN Severe Pain (7 - 10)  simethicone 80 milliGRAM(s) Chew every 4 hours PRN Gas      OBJECTIVE:    Sedation Score:	[ X] Alert	[ ] Drowsy 	[ ] Arousable	[ ] Asleep	[ ] Unresponsive    Side Effects:	[X ] None	[ ] Nausea	[ ] Vomiting	[ ] Pruritus  		[ ] Other:    Vital Signs Last 24 Hrs  T(C): 36.7 (11 Oct 2017 08:15), Max: 36.8 (11 Oct 2017 01:12)  T(F): 98.1 (11 Oct 2017 08:15), Max: 98.2 (11 Oct 2017 01:12)  HR: 88 (11 Oct 2017 08:15) (64 - 96)  BP: 97/65 (11 Oct 2017 08:15) (95/63 - 115/75)  BP(mean): --  RR: 18 (11 Oct 2017 08:15) (18 - 20)  SpO2: 99% (11 Oct 2017 08:15) (96% - 99%)    ASSESSMENT/ PLAN    Therapy to  be:               [X] Continued   [ ] Discontinued   [ ] Changed to PRN Analgesics    Documentation and Verification of current medications:   [X] Done	[ ] Not done, not eligible    Comments:

## 2017-10-11 NOTE — DISCHARGE NOTE OB - PATIENT PORTAL LINK FT
“You can access the [employer] Patient Portal, offered by [facility], by registering with the following website: [email or web address]”

## 2017-10-11 NOTE — DISCHARGE NOTE OB - HOSPITAL COURSE
pt had a uncomplicated  delivery and a benign post partum course pt had ruptured membranes and early labor at 36 weeks 6 days. she had an uncomplicated  delivery and a benign post partum course

## 2017-10-11 NOTE — CHART NOTE - NSCHARTNOTEFT_GEN_A_CORE
PA NOTE     POD#1  LABS    Vital Signs Last 24 Hrs  T(C): 36.6 (11 Oct 2017 05:00), Max: 36.8 (11 Oct 2017 01:12)  T(F): 97.9 (11 Oct 2017 05:00), Max: 98.2 (11 Oct 2017 01:12)  HR: 96 (11 Oct 2017 05:00) (64 - 96)  BP: 95/63 (11 Oct 2017 05:00) (95/63 - 121/76)  BP(mean): 78 (10 Oct 2017 09:20) (78 - 94)  RR: 18 (11 Oct 2017 05:00) (18 - 23)  SpO2: 96% (11 Oct 2017 01:12) (93% - 98%)                          9.3    8.3   )-----------( 85       ( 11 Oct 2017 06:29 )             27.3           A/Plan: Mild thrombocytopenia, post-partum anemia  - Ferrous Sulfate, Colace, Vitamin C supplementation.  - CBC in AM, check platelets  - Monitor for signs/symptoms of anemia

## 2017-10-11 NOTE — LACTATION INITIAL EVALUATION - LACTATION INTERVENTIONS
initiate hand expression routine/Breast/bottle/pump until your supply meets her demand and she feeds effectively and consistently/initiate skin to skin/initiate dual electric pump routine

## 2017-10-11 NOTE — PROGRESS NOTE ADULT - SUBJECTIVE AND OBJECTIVE BOX
Date:  VS:Vital Signs Last 24 Hrs  T(C): 36.7 (11 Oct 2017 08:15), Max: 36.8 (11 Oct 2017 01:12)  T(F): 98.1 (11 Oct 2017 08:15), Max: 98.2 (11 Oct 2017 01:12)  HR: 88 (11 Oct 2017 08:15) (64 - 96)  BP: 97/65 (11 Oct 2017 08:15) (95/63 - 107/70)  BP(mean): --  RR: 18 (11 Oct 2017 08:15) (18 - 18)  SpO2: 99% (11 Oct 2017 08:15) (96% - 99%)    Abdomen soft, fundus firm  Incision clean, dry and intact  Lochia WNL  Voiding, stable

## 2017-10-11 NOTE — DISCHARGE NOTE OB - CARE PROVIDER_API CALL
Edna Sharma), Obstetrics and Gynecology  [address]  [address]  [place] [address]  Phone: [phone]  Fax: [phone]

## 2017-10-11 NOTE — DISCHARGE NOTE OB - MATERIALS PROVIDED
Breastfeeding Mother’s Support Group Information/Guide to Postpartum Care/Back To Sleep Handout/Vaccinations/[facility] Hearing Screen Program/Breastfeeding Guide and Packet/[facility]  Screening Program/Bottle Feeding Log/[place]  Immunization Record/Breastfeeding Log/Shaken Baby Prevention Handout/Birth Certificate Instructions

## 2017-10-12 ENCOUNTER — TRANSCRIPTION ENCOUNTER (OUTPATIENT)
Age: 37
End: 2017-10-12

## 2017-10-12 VITALS
RESPIRATION RATE: 18 BRPM | DIASTOLIC BLOOD PRESSURE: 75 MMHG | TEMPERATURE: 98 F | SYSTOLIC BLOOD PRESSURE: 109 MMHG | HEART RATE: 93 BPM

## 2017-10-12 LAB
HCT VFR BLD CALC: 29 % — LOW (ref 34.5–45)
HGB BLD-MCNC: 10.1 G/DL — LOW (ref 11.5–15.5)
MCHC RBC-ENTMCNC: 32.1 PG — SIGNIFICANT CHANGE UP (ref 27–34)
MCHC RBC-ENTMCNC: 34.8 GM/DL — SIGNIFICANT CHANGE UP (ref 32–36)
MCV RBC AUTO: 92 FL — SIGNIFICANT CHANGE UP (ref 80–100)
PLATELET # BLD AUTO: 97 K/UL — LOW (ref 150–400)
RBC # BLD: 3.16 M/UL — LOW (ref 3.8–5.2)
RBC # FLD: 12.5 % — SIGNIFICANT CHANGE UP (ref 10.3–14.5)
WBC # BLD: 7.7 K/UL — SIGNIFICANT CHANGE UP (ref 3.8–10.5)
WBC # FLD AUTO: 7.7 K/UL — SIGNIFICANT CHANGE UP (ref 3.8–10.5)

## 2017-10-12 PROCEDURE — 86780 TREPONEMA PALLIDUM: CPT

## 2017-10-12 PROCEDURE — G0463: CPT

## 2017-10-12 PROCEDURE — 86901 BLOOD TYPING SEROLOGIC RH(D): CPT

## 2017-10-12 PROCEDURE — 86850 RBC ANTIBODY SCREEN: CPT

## 2017-10-12 PROCEDURE — 59025 FETAL NON-STRESS TEST: CPT

## 2017-10-12 PROCEDURE — 85027 COMPLETE CBC AUTOMATED: CPT

## 2017-10-12 PROCEDURE — 86900 BLOOD TYPING SEROLOGIC ABO: CPT

## 2017-10-12 PROCEDURE — 90715 TDAP VACCINE 7 YRS/> IM: CPT

## 2017-10-12 PROCEDURE — 59514 CESAREAN DELIVERY ONLY: CPT | Mod: AS

## 2017-10-12 PROCEDURE — 88302 TISSUE EXAM BY PATHOLOGIST: CPT

## 2017-10-12 PROCEDURE — 59050 FETAL MONITOR W/REPORT: CPT

## 2017-10-12 RX ORDER — IBUPROFEN 200 MG
600 TABLET ORAL EVERY 6 HOURS
Qty: 0 | Refills: 0 | Status: DISCONTINUED | OUTPATIENT
Start: 2017-10-12 | End: 2017-10-12

## 2017-10-12 RX ORDER — TETANUS TOXOID, REDUCED DIPHTHERIA TOXOID AND ACELLULAR PERTUSSIS VACCINE, ADSORBED 5; 2.5; 8; 8; 2.5 [IU]/.5ML; [IU]/.5ML; UG/.5ML; UG/.5ML; UG/.5ML
0.5 SUSPENSION INTRAMUSCULAR ONCE
Qty: 0 | Refills: 0 | Status: COMPLETED | OUTPATIENT
Start: 2017-10-12 | End: 2017-10-12

## 2017-10-12 RX ORDER — OXYCODONE HYDROCHLORIDE 5 MG/1
5 TABLET ORAL
Qty: 0 | Refills: 0 | Status: DISCONTINUED | OUTPATIENT
Start: 2017-10-12 | End: 2017-10-12

## 2017-10-12 RX ADMIN — OXYCODONE HYDROCHLORIDE 5 MILLIGRAM(S): 5 TABLET ORAL at 10:45

## 2017-10-12 RX ADMIN — Medication 325 MILLIGRAM(S): at 17:43

## 2017-10-12 RX ADMIN — Medication 30 MILLIGRAM(S): at 06:56

## 2017-10-12 RX ADMIN — TETANUS TOXOID, REDUCED DIPHTHERIA TOXOID AND ACELLULAR PERTUSSIS VACCINE, ADSORBED 0.5 MILLILITER(S): 5; 2.5; 8; 8; 2.5 SUSPENSION INTRAMUSCULAR at 17:43

## 2017-10-12 RX ADMIN — Medication 250 MILLIGRAM(S): at 17:43

## 2017-10-12 RX ADMIN — OXYCODONE HYDROCHLORIDE 5 MILLIGRAM(S): 5 TABLET ORAL at 11:30

## 2017-10-12 RX ADMIN — Medication 30 MILLIGRAM(S): at 00:31

## 2017-10-12 RX ADMIN — Medication 30 MILLIGRAM(S): at 06:14

## 2017-10-12 RX ADMIN — Medication 1 TABLET(S): at 11:57

## 2017-10-12 RX ADMIN — Medication 600 MILLIGRAM(S): at 16:23

## 2017-10-12 RX ADMIN — Medication 325 MILLIGRAM(S): at 08:45

## 2017-10-12 RX ADMIN — Medication 30 MILLIGRAM(S): at 01:00

## 2017-10-12 RX ADMIN — Medication 250 MILLIGRAM(S): at 08:45

## 2017-10-12 RX ADMIN — Medication 100 MILLIGRAM(S): at 10:46

## 2017-10-12 RX ADMIN — HEPARIN SODIUM 5000 UNIT(S): 5000 INJECTION INTRAVENOUS; SUBCUTANEOUS at 06:14

## 2017-10-12 RX ADMIN — Medication 975 MILLIGRAM(S): at 15:00

## 2017-10-12 RX ADMIN — Medication 975 MILLIGRAM(S): at 14:16

## 2017-10-12 RX ADMIN — Medication 600 MILLIGRAM(S): at 17:00

## 2017-10-12 NOTE — PROGRESS NOTE ADULT - SUBJECTIVE AND OBJECTIVE BOX
Day 2 of Anesthesia Pain Management Service    SUBJECTIVE: I'm okay  Pain Scale Score:    [X] Refer to charted pain scores    THERAPY: Epidural Bupivacaine 0.01 % and Fentanyl 3 micrograms/mL     Demand Dose: 3 mL  Lockout: 15 minutes   Continuous Rate:  10 mL    MEDICATIONS  (STANDING):  acetaminophen   Tablet. 975 milliGRAM(s) Oral every 6 hours  ascorbic acid 250 milliGRAM(s) Oral two times a day  ferrous    sulfate 325 milliGRAM(s) Oral two times a day with meals  heparin  Injectable 5000 Unit(s) SubCutaneous every 12 hours  ibuprofen  Tablet 600 milliGRAM(s) Oral every 6 hours  influenza   Vaccine 0.5 milliLiter(s) IntraMuscular once  lactated ringers. 1000 milliLiter(s) (125 mL/Hr) IV Continuous <Continuous>  oxyCODONE    IR 5 milliGRAM(s) Oral every 3 hours  oxytocin Infusion 333.333 milliUNIT(s)/Min (1000 mL/Hr) IV Continuous <Continuous>  oxytocin Infusion 41.667 milliUNIT(s)/Min (125 mL/Hr) IV Continuous <Continuous>  prenatal multivitamin 1 Tablet(s) Oral daily    MEDICATIONS  (PRN):  acetaminophen   Tablet 650 milliGRAM(s) Oral every 6 hours PRN For Temp greater than 38.5 C (101.3 F)  dexamethasone  Injectable 4 milliGRAM(s) IV Push every 6 hours PRN Nausea, IF ondansetron is ineffective after 30 - 60 minutes  diphenhydrAMINE   Capsule 25 milliGRAM(s) Oral every 6 hours PRN Itching  docusate sodium 100 milliGRAM(s) Oral two times a day PRN Stool Softening  glycerin Suppository - Adult 1 Suppository(s) Rectal at bedtime PRN Constipation  HYDROmorphone PCA (1 mG/mL) Rescue Clinician Bolus 0.5 milliGRAM(s) IV Push every 15 minutes PRN for Pain Scale GREATER THAN 6  lanolin Ointment 1 Application(s) Topical every 3 hours PRN Sore Nipples  naloxone Injectable 0.1 milliGRAM(s) IV Push every 3 minutes PRN For ANY of the following changes in patient status:  A. RR LESS THAN 10 breaths per minute, B. Oxygen saturation LESS THAN 90%, C. Sedation score of 6  naloxone Injectable 0.1 milliGRAM(s) IV Push every 3 minutes PRN For ANY of the following changes in patient status:  A. RR LESS THAN 10 breaths per minute, B. Oxygen saturation LESS THAN 90%, C. Sedation score of 6  ondansetron Injectable 4 milliGRAM(s) IV Push every 6 hours PRN Nausea  ondansetron Injectable 4 milliGRAM(s) IV Push every 6 hours PRN Nausea  oxyCODONE    IR 5 milliGRAM(s) Oral every 4 hours PRN Severe Pain (7 - 10)  simethicone 80 milliGRAM(s) Chew every 4 hours PRN Gas      OBJECTIVE:    Assessment of Epidural Catheter Site: 	    [ ] Dressing intact	[X] Site non-tender	[X] Site without erythema, discharge, edema  [ ] Epidural tubing and connection checked	[X] Gross neurological exam within normal limits  [X] Catheter removed                          10.1   7.7   )-----------( 97       ( 12 Oct 2017 06:38 )             29.0     Vital Signs Last 24 Hrs  T(C): 36.9 (10-12-17 @ 08:58), Max: 36.9 (10-12-17 @ 05:22)  T(F): 98.4 (10-12-17 @ 08:58), Max: 98.4 (10-12-17 @ 05:22)  HR: 77 (10-12-17 @ 08:58) (77 - 102)  BP: 90/61 (10-12-17 @ 08:58) (90/61 - 115/83)  BP(mean): --  RR: 18 (10-12-17 @ 08:58) (18 - 18)  SpO2: 98% (10-12-17 @ 01:00) (98% - 99%)      Sedation Score:	[X] Alert	[ ] Drowsy	[ ] Arousable  [ ] Asleep  [ ] Unresponsive    Side Effects:	[X] None	[ ] Nausea	[ ] Vomiting  [ ] Pruritus  		[ ] Weakness  [ ] Numbness  [ ] Other:    ASSESSMENT/ PLAN:    Therapy:                         [ ] Continue   [X] Discontinue   [X] Change to PRN Analgesics    Documentation and Verification of current medications:  [X] Done	[ ] Not done, not eligible, reason:    Comments: Pain Management Attending Addendum    SUBJECTIVE: Patient doing well with IV PCA    Therapy:    [X] IV PCA         [ ] PRN Analgesics    OBJECTIVE:   [X] Pain appropriately controlled    [ ] Other:    Side Effects:  [X] None	             [ ] Nausea              [ ] Pruritis                	[ ] Other:    ASSESSMENT/PLAN:  Therapy changed to PRN analgesics    Comments:

## 2017-10-12 NOTE — PROGRESS NOTE ADULT - SUBJECTIVE AND OBJECTIVE BOX
Postpartum Note,  Section  She is a  37y woman who is now post-operative day: 2    Subjective:  The patient feels well, no acute complaints.  Ambulating, tolerating PO diet, denies nausea/vomiting.  Voiding spontaneously and passing gas.  Pain is controlled. Reports normal postpartum bleeding.    Sheldon fever, chills, cough, dysuria.    Physical exam:    Vital Signs Last 24 Hrs  T(C): 36.9 (12 Oct 2017 05:22), Max: 36.9 (12 Oct 2017 05:22)  T(F): 98.4 (12 Oct 2017 05:22), Max: 98.4 (12 Oct 2017 05:22)  HR: 102 (12 Oct 2017 05:22) (87 - 102)  BP: 104/67 (12 Oct 2017 05:22) (97/65 - 115/83)  BP(mean): --  RR: 18 (12 Oct 2017 05:22) (18 - 18)  SpO2: 98% (12 Oct 2017 01:00) (98% - 99%)    Gen: NAD  Abdomen: Soft, nontender, no distension , firm uterine fundus at umbilicus.  Incision: Clean, dry, and intact with steri strips  Pelvic: Normal lochia noted  Ext: No calf tenderness    LABS:                        10.1   7.7   )-----------( 97       ( 12 Oct 2017 06:38 )             29.0                         9.3    8.3   )-----------( 85       ( 11 Oct 2017 06:29 )             27.3                   Allergies    No Known Allergies    Intolerances      MEDICATIONS  (STANDING):  acetaminophen   Tablet. 975 milliGRAM(s) Oral every 6 hours  ascorbic acid 250 milliGRAM(s) Oral two times a day  ferrous    sulfate 325 milliGRAM(s) Oral two times a day with meals  heparin  Injectable 5000 Unit(s) SubCutaneous every 12 hours  HYDROmorphone PCA (1 mG/mL) 30 milliLiter(s) PCA Continuous PCA Continuous  ibuprofen  Tablet 600 milliGRAM(s) Oral every 6 hours  influenza   Vaccine 0.5 milliLiter(s) IntraMuscular once  lactated ringers. 1000 milliLiter(s) (125 mL/Hr) IV Continuous <Continuous>  oxyCODONE    IR 5 milliGRAM(s) Oral every 3 hours  oxytocin Infusion 333.333 milliUNIT(s)/Min (1000 mL/Hr) IV Continuous <Continuous>  oxytocin Infusion 41.667 milliUNIT(s)/Min (125 mL/Hr) IV Continuous <Continuous>  prenatal multivitamin 1 Tablet(s) Oral daily    MEDICATIONS  (PRN):  acetaminophen   Tablet 650 milliGRAM(s) Oral every 6 hours PRN For Temp greater than 38.5 C (101.3 F)  dexamethasone  Injectable 4 milliGRAM(s) IV Push every 6 hours PRN Nausea, IF ondansetron is ineffective after 30 - 60 minutes  diphenhydrAMINE   Capsule 25 milliGRAM(s) Oral every 6 hours PRN Itching  docusate sodium 100 milliGRAM(s) Oral two times a day PRN Stool Softening  glycerin Suppository - Adult 1 Suppository(s) Rectal at bedtime PRN Constipation  HYDROmorphone PCA (1 mG/mL) Rescue Clinician Bolus 0.5 milliGRAM(s) IV Push every 15 minutes PRN for Pain Scale GREATER THAN 6  lanolin Ointment 1 Application(s) Topical every 3 hours PRN Sore Nipples  naloxone Injectable 0.1 milliGRAM(s) IV Push every 3 minutes PRN For ANY of the following changes in patient status:  A. RR LESS THAN 10 breaths per minute, B. Oxygen saturation LESS THAN 90%, C. Sedation score of 6  naloxone Injectable 0.1 milliGRAM(s) IV Push every 3 minutes PRN For ANY of the following changes in patient status:  A. RR LESS THAN 10 breaths per minute, B. Oxygen saturation LESS THAN 90%, C. Sedation score of 6  ondansetron Injectable 4 milliGRAM(s) IV Push every 6 hours PRN Nausea  ondansetron Injectable 4 milliGRAM(s) IV Push every 6 hours PRN Nausea  oxyCODONE    IR 5 milliGRAM(s) Oral every 4 hours PRN Severe Pain (7 - 10)  simethicone 80 milliGRAM(s) Chew every 4 hours PRN Gas

## 2017-10-12 NOTE — PROGRESS NOTE ADULT - PROBLEM SELECTOR PLAN 1
- increase out of bed and ambulation  - analgesia PRN  - continue regular diet    VESTA Mckeon, PGY1
- increase out of bed and ambulation  - analgesia prn, as per anesthesia  - continue regular diet  - check CBC  - D/C janusz (UOP adequate)  - incentive spirometry    VESTA Mckeon, PGY1

## 2017-10-12 NOTE — PROGRESS NOTE ADULT - ASSESSMENT
37y old s/p C/S. Pt stable.  POD # 2
37y old s/p C/S. Pt stable.  POD # 1, PPT at 10/10@625am, afebrile since

## 2017-10-12 NOTE — PROGRESS NOTE ADULT - SUBJECTIVE AND OBJECTIVE BOX
Day 2 of Anesthesia Pain Management Service    SUBJECTIVE: Patient is doing well with IV PCA    Pain Scale Score:	[X] Refer to charted pain scores    THERAPY:    [ ] IV PCA Morphine		[ ] 5 mg/mL	[ ] 1 mg/mL  [X] IV PCA Hydromorphone	[ ] 5 mg/mL	[X] 1 mg/mL  [ ] IV PCA Fentanyl		[ ] 50 micrograms/mL    Demand dose: 0.2 mg     Lockout: 6 minutes   Continuous Rate: 0 mg/hr  4 Hour Limit: 4 mg    MEDICATIONS  (STANDING):  acetaminophen   Tablet. 975 milliGRAM(s) Oral every 6 hours  ascorbic acid 250 milliGRAM(s) Oral two times a day  ferrous    sulfate 325 milliGRAM(s) Oral two times a day with meals  heparin  Injectable 5000 Unit(s) SubCutaneous every 12 hours  ibuprofen  Tablet 600 milliGRAM(s) Oral every 6 hours  influenza   Vaccine 0.5 milliLiter(s) IntraMuscular once  lactated ringers. 1000 milliLiter(s) (125 mL/Hr) IV Continuous <Continuous>  oxyCODONE    IR 5 milliGRAM(s) Oral every 3 hours  oxytocin Infusion 333.333 milliUNIT(s)/Min (1000 mL/Hr) IV Continuous <Continuous>  oxytocin Infusion 41.667 milliUNIT(s)/Min (125 mL/Hr) IV Continuous <Continuous>  prenatal multivitamin 1 Tablet(s) Oral daily    MEDICATIONS  (PRN):  acetaminophen   Tablet 650 milliGRAM(s) Oral every 6 hours PRN For Temp greater than 38.5 C (101.3 F)  dexamethasone  Injectable 4 milliGRAM(s) IV Push every 6 hours PRN Nausea, IF ondansetron is ineffective after 30 - 60 minutes  diphenhydrAMINE   Capsule 25 milliGRAM(s) Oral every 6 hours PRN Itching  docusate sodium 100 milliGRAM(s) Oral two times a day PRN Stool Softening  glycerin Suppository - Adult 1 Suppository(s) Rectal at bedtime PRN Constipation  HYDROmorphone PCA (1 mG/mL) Rescue Clinician Bolus 0.5 milliGRAM(s) IV Push every 15 minutes PRN for Pain Scale GREATER THAN 6  lanolin Ointment 1 Application(s) Topical every 3 hours PRN Sore Nipples  naloxone Injectable 0.1 milliGRAM(s) IV Push every 3 minutes PRN For ANY of the following changes in patient status:  A. RR LESS THAN 10 breaths per minute, B. Oxygen saturation LESS THAN 90%, C. Sedation score of 6  naloxone Injectable 0.1 milliGRAM(s) IV Push every 3 minutes PRN For ANY of the following changes in patient status:  A. RR LESS THAN 10 breaths per minute, B. Oxygen saturation LESS THAN 90%, C. Sedation score of 6  ondansetron Injectable 4 milliGRAM(s) IV Push every 6 hours PRN Nausea  ondansetron Injectable 4 milliGRAM(s) IV Push every 6 hours PRN Nausea  oxyCODONE    IR 5 milliGRAM(s) Oral every 4 hours PRN Severe Pain (7 - 10)  simethicone 80 milliGRAM(s) Chew every 4 hours PRN Gas      OBJECTIVE:    Sedation Score:	[ X] Alert	[ ] Drowsy 	[ ] Arousable	[ ] Asleep	[ ] Unresponsive    Side Effects:	[X ] None	[ ] Nausea	[ ] Vomiting	[ ] Pruritus  		[ ] Other:    Vital Signs Last 24 Hrs  T(C): 36.9 (12 Oct 2017 08:58), Max: 36.9 (12 Oct 2017 05:22)  T(F): 98.4 (12 Oct 2017 08:58), Max: 98.4 (12 Oct 2017 05:22)  HR: 77 (12 Oct 2017 08:58) (77 - 102)  BP: 90/61 (12 Oct 2017 08:58) (90/61 - 115/83)  BP(mean): --  RR: 18 (12 Oct 2017 08:58) (18 - 18)  SpO2: 98% (12 Oct 2017 01:00) (98% - 99%)    ASSESSMENT/ PLAN    Therapy to  be:               [ ] Continued   [X ] Discontinued   [x ] Changed to PRN Analgesics    Documentation and Verification of current medications:   [X] Done	[ ] Not done, not eligible    Comments:

## 2017-10-16 LAB — SURGICAL PATHOLOGY STUDY: SIGNIFICANT CHANGE UP

## 2020-03-04 NOTE — ED ADULT NURSE NOTE - PT NEEDS ASSIST
Medication:   Requested Prescriptions     Pending Prescriptions Disp Refills    atenolol (TENORMIN) 25 MG tablet [Pharmacy Med Name: ATENOLOL 25 MG TABLET] 90 tablet 0     Sig: TAKE ONE TABLET BY MOUTH DAILY       Last Filled:  12/5/2019    Patient Phone Number: [phone] (home)     Last appt: 2/5/2020   Next appt: 3/27/2020    Last BMP:   Lab Results   Component Value Date     01/16/2020    K 4.3 01/16/2020     01/16/2020    CO2 23 01/16/2020    ANIONGAP 15 01/16/2020    GLUCOSE 92 01/16/2020    BUN 13 01/16/2020    CREATININE 0.7 01/16/2020    LABGLOM >60 01/16/2020    GFRAA >60 01/16/2020    GFRAA >60 06/13/2013    CALCIUM 10.1 01/16/2020      Last CMP:   Lab Results   Component Value Date     01/16/2020    K 4.3 01/16/2020     01/16/2020    CO2 23 01/16/2020    ANIONGAP 15 01/16/2020    GLUCOSE 92 01/16/2020    BUN 13 01/16/2020    CREATININE 0.7 01/16/2020    LABGLOM >60 01/16/2020    GFRAA >60 01/16/2020    GFRAA >60 06/13/2013    PROT 7.1 01/16/2020    PROT 6.7 03/19/2013    LABALBU 4.4 01/16/2020    AGRATIO 1.6 01/16/2020    BILITOT 0.5 01/16/2020    ALKPHOS 126 01/16/2020    ALT 44 01/16/2020    AST 27 01/16/2020    GLOB 2.7 01/16/2020     Last Renal Function:   Lab Results   Component Value Date     01/16/2020    K 4.3 01/16/2020     01/16/2020    CO2 23 01/16/2020    GLUCOSE 92 01/16/2020    BUN 13 01/16/2020    CREATININE 0.7 01/16/2020    LABALBU 4.4 01/16/2020    CALCIUM 10.1 01/16/2020    GFR >60 06/13/2013    GFRAA >60 01/16/2020    GFRAA >60 06/13/2013       Last OARRS:   RX Monitoring 2/13/2019   Attestation The Prescription Monitoring Report for this patient was reviewed today. Periodic Controlled Substance Monitoring No signs of potential drug abuse or diversion identified.        Preferred Pharmacy:   [facility] [address] [place] [address]. Chanel Mcdonnell [address] [place] [address] [address] [place] [address]  Phone: [phone] Fax: [phone] no

## 2022-03-25 NOTE — ED PROVIDER NOTE - CARE PLAN
Controlled with current regime Principal Discharge DX:	Lower extremity pain  Instructions for follow-up, activity and diet:	You were seen in the Emergency Department for dizziness, shortness of breath, and leg pain. Advance activity as tolerated. Continue all previously prescribed medications as directed.  Follow up with your obstetrician in 24-48 hours - take copies of your results. You will need to repeat the lower extremity ultrasound in 5-7 days to reevaluate for a deep vein thrombosis. Return to the Emergency Department for worsening or persistent symptoms, and/or ANY NEW OR CONCERNING SYMPTOMS. If you have issues obtaining follow up, please call: [phone] to obtain a doctor or specialist who takes your insurance in your area. Principal Discharge DX:	Lower extremity pain

## 2024-11-20 NOTE — DISCHARGE NOTE OB - IF YOU ARE A SMOKER, IT IS IMPORTANT FOR YOUR HEALTH TO STOP SMOKING. PLEASE BE AWARE THAT SECOND HAND SMOKE IS ALSO HARMFUL.
Quality 226: Preventive Care And Screening: Tobacco Use: Screening And Cessation Intervention: Patient screened for tobacco use and is an ex/non-smoker
Detail Level: Detailed
Quality 431: Preventive Care And Screening: Unhealthy Alcohol Use - Screening: Patient not identified as an unhealthy alcohol user when screened for unhealthy alcohol use using a systematic screening method
Statement Selected

## 2024-11-22 NOTE — PATIENT PROFILE OB - NS PRO TALK SOMEONE YN
Detail Level: Detailed
Patient Specific Counseling (Will Not Stick From Patient To Patient): Biopsy proven. \\nBiopsy done at ENT office Dr Reyes
Detail Level: Generalized
Detail Level: Zone
Detail Level: Simple
Prescription Strength Graduated Compression Stockings Recommendations: The patient was counseled that prescription strength graduated compression stockings should be worn for all waking hours. They will follow up with a venous specialist to monitor graduated compression stocking usage and their symptoms.
no

## 2024-12-29 ENCOUNTER — EMERGENCY (EMERGENCY)
Facility: HOSPITAL | Age: 44
LOS: 1 days | Discharge: HOME | End: 2024-12-29
Attending: STUDENT IN AN ORGANIZED HEALTH CARE EDUCATION/TRAINING PROGRAM
Payer: COMMERCIAL

## 2024-12-29 VITALS
RESPIRATION RATE: 16 BRPM | OXYGEN SATURATION: 99 % | WEIGHT: 145.06 LBS | SYSTOLIC BLOOD PRESSURE: 133 MMHG | HEIGHT: 64 IN | HEART RATE: 90 BPM | TEMPERATURE: 98 F | DIASTOLIC BLOOD PRESSURE: 88 MMHG

## 2024-12-29 PROCEDURE — 90715 TDAP VACCINE 7 YRS/> IM: CPT

## 2024-12-29 PROCEDURE — 70450 CT HEAD/BRAIN W/O DYE: CPT | Mod: 26,MC

## 2024-12-29 PROCEDURE — 99284 EMERGENCY DEPT VISIT MOD MDM: CPT | Mod: 25

## 2024-12-29 PROCEDURE — 70486 CT MAXILLOFACIAL W/O DYE: CPT | Mod: MC

## 2024-12-29 PROCEDURE — 70486 CT MAXILLOFACIAL W/O DYE: CPT | Mod: 26,MC

## 2024-12-29 PROCEDURE — 72125 CT NECK SPINE W/O DYE: CPT | Mod: MC

## 2024-12-29 PROCEDURE — 70450 CT HEAD/BRAIN W/O DYE: CPT | Mod: MC

## 2024-12-29 PROCEDURE — 12011 RPR F/E/E/N/L/M 2.5 CM/<: CPT

## 2024-12-29 PROCEDURE — 90471 IMMUNIZATION ADMIN: CPT

## 2024-12-29 PROCEDURE — 72125 CT NECK SPINE W/O DYE: CPT | Mod: 26,MC

## 2024-12-29 RX ORDER — ACETAMINOPHEN 500MG 500 MG/1
650 TABLET, COATED ORAL ONCE
Refills: 0 | Status: COMPLETED | OUTPATIENT
Start: 2024-12-29 | End: 2024-12-29

## 2024-12-29 RX ORDER — LIDOCAINE HCL 20 MG/ML
5 VIAL (ML) INJECTION ONCE
Refills: 0 | Status: COMPLETED | OUTPATIENT
Start: 2024-12-29 | End: 2024-12-29

## 2024-12-29 RX ORDER — TETANUS TOXOID, REDUCED DIPHTHERIA TOXOID AND ACELLULAR PERTUSSIS VACCINE, ADSORBED 5; 2.5; 8; 8; 2.5 [IU]/.5ML; [IU]/.5ML; UG/.5ML; UG/.5ML; UG/.5ML
0.5 SUSPENSION INTRAMUSCULAR ONCE
Refills: 0 | Status: COMPLETED | OUTPATIENT
Start: 2024-12-29 | End: 2024-12-29

## 2024-12-29 RX ADMIN — ACETAMINOPHEN 500MG 650 MILLIGRAM(S): 500 TABLET, COATED ORAL at 18:07

## 2024-12-29 RX ADMIN — Medication 5 MILLILITER(S): at 18:13

## 2024-12-29 RX ADMIN — TETANUS TOXOID, REDUCED DIPHTHERIA TOXOID AND ACELLULAR PERTUSSIS VACCINE, ADSORBED 0.5 MILLILITER(S): 5; 2.5; 8; 8; 2.5 SUSPENSION INTRAMUSCULAR at 18:09

## 2024-12-29 NOTE — ED ADULT TRIAGE NOTE - CHIEF COMPLAINT QUOTE
Lac to r eyebrow area s/p slip and fall and hit r side of head on tiled jhony ,reports " Feeling tired ,nauseous ,feels like sleeping ".

## 2024-12-29 NOTE — ED PROVIDER NOTE - PATIENT PORTAL LINK FT
You can access the FollowMyHealth Patient Portal offered by [facility] by registering at the following website: [email or web address]. By joining [facility] [employer]’s FollowMyHealth portal, you will also be able to view your health information using other applications (apps) compatible with our system.

## 2024-12-29 NOTE — ED PROVIDER NOTE - CLINICAL SUMMARY MEDICAL DECISION MAKING FREE TEXT BOX
44-year-old female with no known past medical history, not on any blood thinners coming in after she slipped and hit the right side of her face on tile floor.  No LOC.  Reports since the fall she has been feeling lightheaded and nauseous.  Patient sustained a laceration to her right eyebrow.  Does not recall having tetanus.      Well-appearing.  No distress.  Equal strength and sensation in all of the extremities.  Noted about 1 cm laceration at the lateral aspect of right eyebrow.  No active bleeding.    Differential diagnosis include but not limited to laceration, head bleed, facial fracture.

## 2024-12-29 NOTE — ED ADULT NURSE NOTE - OBJECTIVE STATEMENT
Patient presents/p fall + head injury denies LOC sustained abrasion to right eyebrow noted no bleeding observed

## 2024-12-30 PROBLEM — Z33.1 PREGNANT STATE, INCIDENTAL: Chronic | Status: ACTIVE | Noted: 2017-08-24
